# Patient Record
Sex: FEMALE | Race: BLACK OR AFRICAN AMERICAN | NOT HISPANIC OR LATINO | URBAN - METROPOLITAN AREA
[De-identification: names, ages, dates, MRNs, and addresses within clinical notes are randomized per-mention and may not be internally consistent; named-entity substitution may affect disease eponyms.]

---
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
## 2025-03-13 NOTE — BH SAFETY PLAN - WARNING SIGN 1
PATIENT: SKYLER HOLMAN   -  : 1990   -  DOS:2025   -  INTERPRETING PROVIDER:Kimi Espinosa,   Indication  ========    Obesity in pregnancy BMI 37.8, GDM insulin    Method  ======    External Fetal Monitor and transabdominal ultrasound examination. View: Good view    Pregnancy  =========    Negro pregnancy. Number of fetuses: 1    Dating  ======    LMP on: 2024  Cycle: regular cycle  GA by LMP 36 w + 0 d  DEEPTI by LMP: 4/10/2025  Previous Ultrasound on: 9/10/2024  Type of prior assessment: GA  GA at prior assessment date 9 w + 5 d  GA by previous U/S 36 w + 0 d  DEEPTI by previous Ultrasound: 4/10/2025  Assigned: based on the LMP, selected on 2024  Assigned GA 36 w + 0 d  Assigned DEEPTI: 4/10/2025    General Evaluation  ==============    Cardiac activity present.  bpm. Fetal movements: visualized. Presentation: Cephalic  Placenta: Placental site: anterior, appropriate distance from the internal os  Umbilical cord: Cord vessels: 3 vessel cord    Fetal Anatomy  ===========    Stomach: normal  Kidneys: normal  Bladder: normal  Wants to know fetal sex: yes    Amniotic Fluid Assessment  =====================    Amount of AF: normal  MVP 4.9 cm. MARIN 14.6 cm. Q1 4.9 cm, Q2 4.2 cm, Q3 4.5 cm, Q4 1.0 cm    Biophysical Profile  ==============    0: Fetal breathing movements  2: Gross body movements  2: Fetal tone  2: Amniotic fluid volume  NST: reactive  8/10 Biophysical profile score    Non Stress Test  =============    NST interpretation: reactive. Test duration 20 min. Baseline  bpm. Baseline variability: moderate. Accelerations: present. Decelerations: absent. Uterine activity:  present, 1 contraction, patient tolerating    Findings  =======    Intrauterine Negro pregnancy at 36w 0d by clinical dates.  Amniotic fluid: normal.  Placenta is anterior, appropriate distance from the internal os.  Cephalic presentation.  Biophysical profile score is 8/10.  NST is reactive.  The 
feeling lonely